# Patient Record
Sex: MALE | Race: OTHER | HISPANIC OR LATINO | URBAN - METROPOLITAN AREA
[De-identification: names, ages, dates, MRNs, and addresses within clinical notes are randomized per-mention and may not be internally consistent; named-entity substitution may affect disease eponyms.]

---

## 2024-07-30 ENCOUNTER — EMERGENCY (EMERGENCY)
Facility: HOSPITAL | Age: 49
LOS: 1 days | Discharge: HOME | End: 2024-07-30
Attending: EMERGENCY MEDICINE
Payer: COMMERCIAL

## 2024-07-30 VITALS
HEART RATE: 55 BPM | RESPIRATION RATE: 18 BRPM | DIASTOLIC BLOOD PRESSURE: 99 MMHG | HEIGHT: 66 IN | TEMPERATURE: 97 F | OXYGEN SATURATION: 98 % | WEIGHT: 154.98 LBS | SYSTOLIC BLOOD PRESSURE: 186 MMHG

## 2024-07-30 VITALS
OXYGEN SATURATION: 98 % | TEMPERATURE: 98 F | HEART RATE: 57 BPM | DIASTOLIC BLOOD PRESSURE: 79 MMHG | RESPIRATION RATE: 18 BRPM | SYSTOLIC BLOOD PRESSURE: 137 MMHG

## 2024-07-30 LAB
ADD ON TEST-SPECIMEN IN LAB: SIGNIFICANT CHANGE UP
ALBUMIN SERPL ELPH-MCNC: 4.9 G/DL — SIGNIFICANT CHANGE UP (ref 3.3–5)
ALP SERPL-CCNC: 109 U/L — SIGNIFICANT CHANGE UP (ref 40–120)
ALT FLD-CCNC: 24 U/L — SIGNIFICANT CHANGE UP (ref 10–45)
ANION GAP SERPL CALC-SCNC: 14 MMOL/L — SIGNIFICANT CHANGE UP (ref 5–17)
APTT BLD: 34.4 SEC — SIGNIFICANT CHANGE UP (ref 24.5–35.6)
AST SERPL-CCNC: 20 U/L — SIGNIFICANT CHANGE UP (ref 10–40)
BASE EXCESS BLDV CALC-SCNC: 1.6 MMOL/L — SIGNIFICANT CHANGE UP (ref -2–3)
BASOPHILS # BLD AUTO: 0.02 K/UL — SIGNIFICANT CHANGE UP (ref 0–0.2)
BASOPHILS NFR BLD AUTO: 0.3 % — SIGNIFICANT CHANGE UP (ref 0–2)
BILIRUB SERPL-MCNC: 0.3 MG/DL — SIGNIFICANT CHANGE UP (ref 0.2–1.2)
BUN SERPL-MCNC: 14 MG/DL — SIGNIFICANT CHANGE UP (ref 7–23)
CA-I SERPL-SCNC: 1.23 MMOL/L — SIGNIFICANT CHANGE UP (ref 1.15–1.33)
CALCIUM SERPL-MCNC: 10 MG/DL — SIGNIFICANT CHANGE UP (ref 8.4–10.5)
CHLORIDE BLDV-SCNC: 103 MMOL/L — SIGNIFICANT CHANGE UP (ref 96–108)
CHLORIDE SERPL-SCNC: 105 MMOL/L — SIGNIFICANT CHANGE UP (ref 96–108)
CO2 BLDV-SCNC: 29 MMOL/L — HIGH (ref 22–26)
CO2 SERPL-SCNC: 24 MMOL/L — SIGNIFICANT CHANGE UP (ref 22–31)
CREAT SERPL-MCNC: 0.86 MG/DL — SIGNIFICANT CHANGE UP (ref 0.5–1.3)
EGFR: 105 ML/MIN/1.73M2 — SIGNIFICANT CHANGE UP
EOSINOPHIL # BLD AUTO: 0.07 K/UL — SIGNIFICANT CHANGE UP (ref 0–0.5)
EOSINOPHIL NFR BLD AUTO: 1 % — SIGNIFICANT CHANGE UP (ref 0–6)
GAS PNL BLDV: 132 MMOL/L — LOW (ref 136–145)
GAS PNL BLDV: SIGNIFICANT CHANGE UP
GLUCOSE BLDV-MCNC: 92 MG/DL — SIGNIFICANT CHANGE UP (ref 70–99)
GLUCOSE SERPL-MCNC: 96 MG/DL — SIGNIFICANT CHANGE UP (ref 70–99)
HCO3 BLDV-SCNC: 27 MMOL/L — SIGNIFICANT CHANGE UP (ref 22–29)
HCT VFR BLD CALC: 44 % — SIGNIFICANT CHANGE UP (ref 39–50)
HCT VFR BLDA CALC: 47 % — SIGNIFICANT CHANGE UP (ref 39–51)
HGB BLD CALC-MCNC: 15.5 G/DL — SIGNIFICANT CHANGE UP (ref 12.6–17.4)
HGB BLD-MCNC: 15.1 G/DL — SIGNIFICANT CHANGE UP (ref 13–17)
IMM GRANULOCYTES NFR BLD AUTO: 0.4 % — SIGNIFICANT CHANGE UP (ref 0–0.9)
INR BLD: 1.04 RATIO — SIGNIFICANT CHANGE UP (ref 0.85–1.18)
LACTATE BLDV-MCNC: 1 MMOL/L — SIGNIFICANT CHANGE UP (ref 0.5–2)
LYMPHOCYTES # BLD AUTO: 2.64 K/UL — SIGNIFICANT CHANGE UP (ref 1–3.3)
LYMPHOCYTES # BLD AUTO: 36.8 % — SIGNIFICANT CHANGE UP (ref 13–44)
MCHC RBC-ENTMCNC: 31.5 PG — SIGNIFICANT CHANGE UP (ref 27–34)
MCHC RBC-ENTMCNC: 34.3 GM/DL — SIGNIFICANT CHANGE UP (ref 32–36)
MCV RBC AUTO: 91.7 FL — SIGNIFICANT CHANGE UP (ref 80–100)
MONOCYTES # BLD AUTO: 0.69 K/UL — SIGNIFICANT CHANGE UP (ref 0–0.9)
MONOCYTES NFR BLD AUTO: 9.6 % — SIGNIFICANT CHANGE UP (ref 2–14)
NEUTROPHILS # BLD AUTO: 3.72 K/UL — SIGNIFICANT CHANGE UP (ref 1.8–7.4)
NEUTROPHILS NFR BLD AUTO: 51.9 % — SIGNIFICANT CHANGE UP (ref 43–77)
NRBC # BLD: 0 /100 WBCS — SIGNIFICANT CHANGE UP (ref 0–0)
PCO2 BLDV: 45 MMHG — SIGNIFICANT CHANGE UP (ref 42–55)
PH BLDV: 7.39 — SIGNIFICANT CHANGE UP (ref 7.32–7.43)
PLATELET # BLD AUTO: 283 K/UL — SIGNIFICANT CHANGE UP (ref 150–400)
PO2 BLDV: 31 MMHG — SIGNIFICANT CHANGE UP (ref 25–45)
POTASSIUM BLDV-SCNC: 3.9 MMOL/L — SIGNIFICANT CHANGE UP (ref 3.5–5.1)
POTASSIUM SERPL-MCNC: 4.1 MMOL/L — SIGNIFICANT CHANGE UP (ref 3.5–5.3)
POTASSIUM SERPL-SCNC: 4.1 MMOL/L — SIGNIFICANT CHANGE UP (ref 3.5–5.3)
PROT SERPL-MCNC: 7.8 G/DL — SIGNIFICANT CHANGE UP (ref 6–8.3)
PROTHROM AB SERPL-ACNC: 10.9 SEC — SIGNIFICANT CHANGE UP (ref 9.5–13)
RBC # BLD: 4.8 M/UL — SIGNIFICANT CHANGE UP (ref 4.2–5.8)
RBC # FLD: 12.6 % — SIGNIFICANT CHANGE UP (ref 10.3–14.5)
SAO2 % BLDV: 52.7 % — LOW (ref 67–88)
SODIUM SERPL-SCNC: 143 MMOL/L — SIGNIFICANT CHANGE UP (ref 135–145)
TROPONIN T, HIGH SENSITIVITY RESULT: 6 NG/L — SIGNIFICANT CHANGE UP (ref 0–51)
WBC # BLD: 7.17 K/UL — SIGNIFICANT CHANGE UP (ref 3.8–10.5)
WBC # FLD AUTO: 7.17 K/UL — SIGNIFICANT CHANGE UP (ref 3.8–10.5)

## 2024-07-30 RX ORDER — TETRACAINE HCL 0.5 %
1 DROPS OPHTHALMIC (EYE) ONCE
Refills: 0 | Status: DISCONTINUED | OUTPATIENT
Start: 2024-07-30 | End: 2024-07-30

## 2024-07-30 RX ORDER — ATORVASTATIN CALCIUM 20 MG/1
1 TABLET, FILM COATED ORAL
Qty: 30 | Refills: 0
Start: 2024-07-30 | End: 2024-08-28

## 2024-07-30 RX ORDER — ASPIRIN 325 MG/1
81 TABLET, FILM COATED ORAL ONCE
Refills: 0 | Status: COMPLETED | OUTPATIENT
Start: 2024-07-30 | End: 2024-07-30

## 2024-07-30 RX ORDER — MECLIZINE HCL 25 MG
25 TABLET ORAL ONCE
Refills: 0 | Status: COMPLETED | OUTPATIENT
Start: 2024-07-30 | End: 2024-07-30

## 2024-07-30 RX ORDER — ATORVASTATIN CALCIUM 20 MG/1
40 TABLET, FILM COATED ORAL ONCE
Refills: 0 | Status: COMPLETED | OUTPATIENT
Start: 2024-07-30 | End: 2024-07-30

## 2024-07-30 RX ORDER — METOCLOPRAMIDE 5 MG/5ML
10 SOLUTION ORAL ONCE
Refills: 0 | Status: COMPLETED | OUTPATIENT
Start: 2024-07-30 | End: 2024-07-30

## 2024-07-30 RX ADMIN — METOCLOPRAMIDE 10 MILLIGRAM(S): 5 SOLUTION ORAL at 17:54

## 2024-07-30 RX ADMIN — Medication 25 MILLIGRAM(S): at 17:54

## 2024-07-30 RX ADMIN — ATORVASTATIN CALCIUM 40 MILLIGRAM(S): 20 TABLET, FILM COATED ORAL at 18:41

## 2024-07-30 RX ADMIN — ASPIRIN 81 MILLIGRAM(S): 325 TABLET, FILM COATED ORAL at 18:41

## 2024-07-30 NOTE — ED PROVIDER NOTE - OBJECTIVE STATEMENT
49-year-old male with past medical history of glaucoma and hyperlipidemia presenting with room spinning dizziness that was sudden onset while he was at work.  Patient also notes associated headache during this time.  Patient states that his symptoms at the time of evaluation are somewhat resolved but patient states that this was different than any headache or evidence of dizziness that he had had in the past.  Denies fever cough chest pain shortness of breath nausea vomiting diarrhea or dysuria.

## 2024-07-30 NOTE — ED PROVIDER NOTE - PHYSICAL EXAMINATION
PHYSICAL EXAM:  CONSTITUTIONAL: Well appearing, awake, alert, oriented to person, place, time/situation and in no apparent distress.  HEAD: Atraumatic  EYES: Clear bilaterally, pupils equal, round and reactive to light. OD:17mmHg   ENMT: Airway patent, Nasal mucosa clear. Mouth with normal mucosa. Uvula is midline.   CARDIAC: Normal rate, regular rhythm. +S1/S2. No murmurs, rubs or gallops.  RESPIRATORY: Breathing unlabored. Breath sounds clear and equal bilaterally.  ABDOMEN:  Soft, nontender, nondistended. No rebound tenderness or guarding.  NEUROLOGICAL: Alert and oriented, no focal deficits, no motor or sensory deficits. CN2-12 intact. Sensation intact x4 extremities.  SKIN: Skin warm and dry. No evidence of rashes or lesions.

## 2024-07-30 NOTE — ED ADULT NURSE NOTE - NSFALLUNIVINTERV_ED_ALL_ED
radiology results pending
Bed/Stretcher in lowest position, wheels locked, appropriate side rails in place/Call bell, personal items and telephone in reach/Instruct patient to call for assistance before getting out of bed/chair/stretcher/Non-slip footwear applied when patient is off stretcher/[place] to call system/Physically safe environment - no spills, clutter or unnecessary equipment/Purposeful proactive rounding/Room/bathroom lighting operational, light cord in reach

## 2024-07-30 NOTE — ED PROVIDER NOTE - NSFOLLOWUPCLINICS_GEN_ALL_ED_FT
[facility] - ENT  Otolaryngology (ENT)  [address] [place] [address]  Phone: [phone]  Fax:

## 2024-07-30 NOTE — ED PROVIDER NOTE - CARE PROVIDER_API CALL
Floyd Hooper  Neurology  [address], [address]  [place] [address]  Phone: [phone]  Fax: [phone]  Follow Up Time: Routine

## 2024-07-30 NOTE — ED ADULT NURSE NOTE - OBJECTIVE STATEMENT
49 yr old male with only h/o high cholesterol came in after being on his computer stated had a slight headache with some worsening blurred vision in right eye. has glaucoma in right eye. then states lost vision in both and saw colors in right eye. also had episode of dizziness. finger stick 127. on assessment a and o x 4 lungs clear abd soft non tender no swelling in extremities no n/v/d no fevers. says he feels normal right now. neuro assessment was clear no deficits.

## 2024-07-30 NOTE — ED PROVIDER NOTE - CLINICAL SUMMARY MEDICAL DECISION MAKING FREE TEXT BOX
49-year-old male with sudden onset headache and dizziness, stroke activated.  Will draw labs per stroke protocol.  Patient without actionable vitals and physical exam findings.   Will check eye pressures given history of glaucoma dispo per neurology 49-year-old male with sudden onset headache and dizziness, stroke activated because of associated visual loss.  Will draw labs per stroke protocol.  Patient without actionable vitals and physical exam findings.   CN 2-12 intact. Strength 5/5 throughout. SILT throughout. Normal finger-to-nose. No pronator drift. Normal gait. Will give meclizine, reglan, Will check eye pressures given history of glaucoma dispo per neurology.    Dr. Francois (Attending Physician)

## 2024-07-30 NOTE — ED PROVIDER NOTE - PATIENT PORTAL LINK FT
You can access the FollowMyHealth Patient Portal offered by [facility] by registering at the following website: [email or web address]. By joining [facility]’s FollowMyHealth portal, you will also be able to view your health information using other applications (apps) compatible with our system.

## 2024-07-30 NOTE — CONSULT NOTE ADULT - ASSESSMENT
ASSESSMENT   49-year-old R handed man, history HLD, arthrits in R shoulder, denies history of HTN, denies history of DM, glaucomain R eye presenting as code stroke for vertiginous symptoms.  CThead CTA negative. Notcandidatefor tenecteplase as symptomsresolved/non disabling. Notcandidate for thrombectomy asnoLVO.  NIHSS 0  MRS 0  LKW 1530 7/30/24  Not on AC/AP   /99  glucose 126    IMPRESSION   (1) brief episode of acute vestibular syndrome, resolved after several minutes positional in nature, more likely peripheral source. Advocates ear pain when attempting to performdix-hallpike  (2) blurred vision following dizziness b/l, followed by blurred vision in R eye with positive symptoms (seeing colors) w/ hx glaucoma  (3) slight L frontal headache non-positional in nature  may be central or peripheral, though given onset of symptoms with positional change followed by headache without other noted deficits (speech/swallowing/ataxia on exam) higher suspicion of peripheral source. Other differentials include BPPV,vestib migraine, primary ophthalmologic cause    RECOMMENDATION incomplete;not yet discussed with neurovasc fellow  [] migraine cocktail  [] vertigo symptomatic management PRN if symptoms return (meclizine 12.5 mg BID PRN)  [] A1c, lipid panel  [] orthostatics  [] would consider opthalmology evaluation for R eye +symptoms, blurred vision to evaluate for primary optho pathology  [] further workup of ?R ear pain per primary team  [] BGM goals 140-180  [] Refer for Vestibular Rehab on discharge  [] ENT eval on discharge  [] Neurology f/u outpatient  Upon discharge, patient should follow up with Dr. Hooper:  [phone] [address] [place] [address]  -If insurance is problem, can follow up with resident clinic at [address] [place] [address]. Can call [phone] for an appointment    Note finalized upon attestation.  ASSESSMENT   49-year-old R handed man, history HLD, arthrits in R shoulder, denies history of HTN, denies history of DM, glaucomain R eye presenting as code stroke for vertiginous symptoms.  CThead CTA negative. Notcandidatefor tenecteplase as symptomsresolved/non disabling. Notcandidate for thrombectomy asnoLVO.  NIHSS 0  MRS 0  LKW 1530 7/30/24  Not on AC/AP   /99  glucose 126    IMPRESSION   (1) brief episode of acute vestibular syndrome, resolved after several minutes positional in nature, more likely peripheral source. Advocates ear pain when attempting to performdix-hallpike  (2) blurred vision following dizziness b/l, followed by blurred vision in R eye with positive symptoms (seeing colors) w/ hx glaucoma  (3) slight L frontal headache non-positional in nature  may be central or peripheral, though given onset of symptoms with positional change followed by headache without other noted deficits (speech/swallowing/ataxia on exam) higher suspicion of peripheral source. Other differentials include BPPV,vestib migraine, primary ophthalmologic cause    RECOMMENDATION   [] aspirin 81 daily  [] atorvastatin 80 QHS  [] migraine cocktail PRN for headache symptoms  [] TTE, MRI brain w/o contrast which can be performed in outpatient setting  [] vertigo symptomatic management PRN if symptoms return (meclizine 12.5 mg BID PRN)  [] A1c, lipid panel  [] orthostatics  [] would consider ophthalmology evaluation for R eye +symptoms, blurred vision to evaluate for primary optho pathology  [] further workup of ?L ear pain per primary team  [] BGM goals 140-180  [] Refer for Vestibular Rehab on discharge  [] ENT eval on discharge  [] Neurology f/u outpatient  Upon discharge, patient should follow up with Dr. Hooper:  [phone] [address] [place] [address]  -If insurance is problem, can follow up with resident clinic at [address] [place] [address]. Can call [phone] for an appointment    discussed with neurovascular fellow dr ojeda under supervision of dr tadeo neurovascular fellow  Note finalized upon attestation.  ASSESSMENT   49-year-old R handed man, history HLD, arthrits in R shoulder, denies history of HTN, denies history of DM, glaucomain R eye presenting as code stroke for vertiginous symptoms.  CThead CTA negative. Notcandidatefor tenecteplase as symptomsresolved/non disabling. Notcandidate for thrombectomy asnoLVO.  NIHSS 0  MRS 0  LKW 1530 7/30/24  Not on AC/AP   /99  glucose 126    IMPRESSION   (1) brief episode of acute vestibular syndrome, resolved after several minutes positional in nature, more likely peripheral source. Advocates ear pain when attempting to performdix-hallpike  (2) blurred vision following dizziness b/l, followed by blurred vision in R eye with positive symptoms (seeing colors) w/ hx glaucoma  (3) slight L frontal headache non-positional in nature  may be central or peripheral, though given onset of symptoms with positional change followed by headache without other noted deficits (speech/swallowing/ataxia on exam) higher suspicion of peripheral source. Other differentials include BPPV,vestib migraine, primary ophthalmologic cause    RECOMMENDATION   [] aspirin 81 daily  [] atorvastatin 80 QHS  [] migraine cocktail PRN for headache symptoms  [] TTE, MRI brain w/o contrast which can be performed in outpatient setting  [] vertigo symptomatic management PRN if symptoms return (meclizine 12.5 mg BID PRN)  [] A1c, lipid panel  [] orthostatics  [] would consider ophthalmology evaluation for R eye +symptoms, blurred vision to evaluate for primary optho pathology  [] further workup of ?L ear pain per primary team  [] BGM goals 140-180  [] Refer for Vestibular Rehab on discharge  [] ENT eval on discharge  [] Neurology f/u outpatient  Upon discharge, patient should follow up with Dr. Hooper:  [phone] [address] [place] [place] [address]  -If insurance is problem, can follow up with resident clinic at [address] [place] [address]. Can call [phone] for an appointment    discussed with neurovascular fellow dr ojeda under supervision of dr tadeo neurovascular attending  Note finalized upon attestation.  ASSESSMENT   49-year-old R handed man, history HLD, arthrits in R shoulder, denies history of HTN, denies history of DM, glaucomain R eye presenting as code stroke for vertiginous symptoms.  CThead CTA negative. Notcandidatefor tenecteplase as symptomsresolved/non disabling. Notcandidate for thrombectomy asnoLVO.  NIHSS 0  MRS 0  LKW 1530 7/30/24  Not on AC/AP   /99  glucose 126    IMPRESSION   (1) brief episode of acute vestibular syndrome, resolved after several minutes positional in nature, more likely peripheral source. Advocates ear pain when attempting to performdix-hallpike  (2) blurred vision following dizziness b/l, followed by blurred vision in R eye with positive symptoms (seeing colors) w/ hx glaucoma  (3) slight L frontal headache non-positional in nature  may be central or peripheral, though given onset of symptoms with positional change followed by headache without other noted deficits (speech/swallowing/ataxia on exam) higher suspicion of peripheral source. Other differentials include BPPV,vestib migraine, primary ophthalmologic cause    RECOMMENDATION   [] aspirin 81 daily  [] atorvastatin 80 QHS  [] migraine cocktail PRN for headache symptoms  [] TTE, MRI brain w/o contrast which can be performed in outpatient setting  [] vertigo symptomatic management PRN if symptoms return (meclizine 12.5 mg BID PRN)  [] A1c, lipid panel  [] orthostatics  [] would consider ophthalmology evaluation for R eye +symptoms, blurred vision; defer to primary team  [] further workup of ?L ear pain per primary team  [] BGM goals 140-180  [] Refer for Vestibular Rehab on discharge  [] ENT eval on discharge  [] Neurology f/u outpatient  Upon discharge, patient should follow up with Dr. Hooper:  [phone] [address] [place] [address]  -If insurance is problem, can follow up with resident clinic at [address] [place] [address]. Can call [phone] for an appointment    discussed with neurovascular fellow dr ojeda under supervision of dr tadeo neurovascular attending  Note finalized upon attestation.

## 2024-07-30 NOTE — CONSULT NOTE ADULT - SUBJECTIVE AND OBJECTIVE BOX
Neurology - Consult Note    Spectra: [phone] ([facility]), [phone] ([facility])    HPI: 49-year-old R handed man, history HLD, denies history of HTN, denies history of DM, presenting as code stroke for vertiginous symptoms.    Patient reports he stood up and turned his head to the left side, then noted acute onset of     NIHSS 0  MRS 0  LKW 1530 7/30/24  Not on AC/AP   /99  glucose 126        Review of Systems:  INCOMPLETE   CONSTITUTIONAL: No fevers or chills  EYES AND ENT: No visual changes or no throat pain   NECK: No pain or stiffness  RESPIRATORY: No shortness of breath  CARDIOVASCULAR: No chest pain or palpitations  GASTROINTESTINAL: No nausea or vomiting   GENITOURINARY: No dysuria  NEUROLOGICAL: +As stated in HPI above  SKIN: No itching, burning, rashes, or lesions   All other review of systems is negative unless indicated above.    Allergies:  Allergy Status Unknown      PMHx/PSHx/Family Hx: As above, otherwise see below       Social Hx:  Never smoker; no current use of tobacco, alcohol, or illicit drugs  Lives with ***  Occupation ***  Baseline functional status is ***    Vitals:  T(C): 36.3 (07-30-24 @ 16:44), Max: 36.3 (07-30-24 @ 16:44)  HR: 55 (07-30-24 @ 16:44) (55 - 55)  BP: 186/99 (07-30-24 @ 16:44) (186/99 - 186/99)  RR: 18 (07-30-24 @ 16:44) (18 - 18)  SpO2: 98% (07-30-24 @ 16:44) (98% - 98%)    Physical Examination: INCOMPLETE  General - non-toxic appearing male/female in no acute distress  Neurologic Exam:  Mental status - Awake, Alert, Oriented to person, place, and time. Speech fluent, repetition and naming intact. Follows simple and complex commands.  Cranial nerves - PERRLA (4mm -> 3mm b/l), VFF, EOMI, face sensation (V1-V3) intact b/l, facial strength intact without asymmetry b/l, hearing intact b/l, palate with symmetric elevation,  sternocleidomastiod 5/5 strength b/l, tongue midline on protrusion with full lateral movement  Motor - Normal bulk and tone throughout. No pronator drift.  Strength testing            Deltoid      Biceps      Triceps     Wrist Extension    Wrist Flexion     Interossei         R            5                 5               5                     5                              5                        5                 5  L             5                 5               5                     5                              5                        5                 5              Hip Flexion    Hip Extension    Knee Flexion    Knee Extension    Dorsiflexion    Plantar Flexion  R              5                         5                       5                           5                            5                          5  L              5                         5                        5                           5                            5                          5  Sensation - Light touch/temperature OR pain/vibration intact throughout  DTR's -             Biceps      Triceps     Brachioradialis      Patellar    Ankle    Toes/plantar response  R             2+             2+                  2+                       2+            2+                 Down  L              2+             2+                 2+                        2+           2+                 Down    Coordination - Finger to Nose intact b/l. No tremors appreciated  Gait and station - Normal casual gait. Romberg (-)    Labs:          CAPILLARY BLOOD GLUCOSE      POCT Blood Glucose.: 126 mg/dL (30 Jul 2024 16:45)            Radiology:     Neurology - Consult Note    Spectra: [phone] ([facility]), [phone] ([facility])    HPI: 49-year-old R handed man, history HLD, arthrits in R shoulder, denies history of HTN, denies history of DM, glaucomain R eye presenting as code stroke for vertiginous symptoms.    Patient reports he stood up and turned his head to the left side, then noted acute onset of 'room spinning sensation'that lasted for about 5-6 minutes. He sat down, drank some water. States after this, noted b/l blurred vision which lasted for 4-5 minutes, followed be gradual resolution, though he noted his R eyehaving persistent blurred vision followed by 'colors' of orange,bluewhenever he looked down. Symptomsmresolved after several minutes. Noted after this episode slight L frontal headache.     Reports when attempting shonda-hallpike pressure/pain in L ear and in sinus region.    States no Hx vertigo,no Hxsmoking, novertigo in family.  Reports prior headaches diffuse and attributes to glaucoma.     No recentinjuries, no double vision, no slurred speech/changesin speech, no changesin hearing/ringing/pain.  Reports sore throat 1-2 weeks ago resolvedonits own.    Does not take any medications (states HLD managed through lifestyle changes)  Reports baseline light sensitivity.    NIHSS 0  MRS 0  LKW 1530 7/30/24  Not on AC/AP   /99  glucose 126        Review of Systems:  NEUROLOGICAL: +As stated in HPI above  All other review of systems is negative unless indicated above.    Allergies:  Allergy Status Unknown      PMHx/PSHx/Family Hx: As above, otherwise see below       Social Hx:  as above    Vitals:  T(C): 36.3 (07-30-24 @ 16:44), Max: 36.3 (07-30-24 @ 16:44)  HR: 55 (07-30-24 @ 16:44) (55 - 55)  BP: 186/99 (07-30-24 @ 16:44) (186/99 - 186/99)  RR: 18 (07-30-24 @ 16:44) (18 - 18)  SpO2: 98% (07-30-24 @ 16:44) (98% - 98%)    Physical Examination:   General - non-toxic appearing male in no acute distress  Neurologic Exam:  Mental status - Awake, Alert, Oriented to person, place, and time. Speech fluent, repetition and naming intact. Follows simple commands.  Cranial nerves - PERRLA (4mm -> 3mm b/l), VFF, EOMI, face sensation (V1-V3) intact b/l, facial strength intact without asymmetry b/l, hearing intact b/l, palate with symmetric elevation,  sternocleidomastiod 5/5 strength b/l, tongue midline on protrusion with full lateral movement. HINTS equivocal,no verticalskew,norapid corerction with head impulse howeverlimited aspatientnot relaxing neck with prompting. Shonda-hallpike negative.  Motor - Normal bulk and tone throughout. No pronator drift.  Strength testing 5/5 b/l UE,LE,  Sensation - Light intact throughout  Coordination - Finger to Nose intact b/l. Heel-shin intactb/l  Gait and station - Normal casual gait.     Labs:          CAPILLARY BLOOD GLUCOSE      POCT Blood Glucose.: 126 mg/dL (30 Jul 2024 16:45)            Radiology:    CT brain:  No hydrocephalus, acute intracranial hemorrhage, mass effect, or brain   edema.    Small chronic right cerebellar hemisphere infarct.    CTA brain:  No flow-limiting stenosis or vascular aneurysm. No AVM.    Venous system is well opacified, no evidence for venous sinus or cortical   vein thrombosis.    CTA neck:  No flow-limiting stenosis, evidence for arterial dissection, or vascular   aneurysm.

## 2024-07-30 NOTE — ED PROVIDER NOTE - NSFOLLOWUPINSTRUCTIONS_ED_ALL_ED_FT
You were seen in the emergency department with dizziness.  You had CAT scans of your head which did not show any acute abnormalities.  There was an area of the CAT scan that was abnormal which could possibly have been a stroke in the past.  For this reason our neurology colleagues would like you to take daily aspirin 81 mg, and also begin taking atorvastatin 80 mg once a day.        You should follow-up with Dr. Perez as discussed  You should follow-up with the ear nose and throat doctors for vestibular therapy for your vertigo.      Please return to the emergency department if your symptoms persist or worsen    Follow-up with your primary care physician.

## 2024-08-01 PROBLEM — Z00.00 ENCOUNTER FOR PREVENTIVE HEALTH EXAMINATION: Status: ACTIVE | Noted: 2024-08-01

## 2024-09-02 ENCOUNTER — NON-APPOINTMENT (OUTPATIENT)
Age: 49
End: 2024-09-02

## 2024-09-03 ENCOUNTER — APPOINTMENT (OUTPATIENT)
Dept: OPHTHALMOLOGY | Facility: CLINIC | Age: 49
End: 2024-09-03
Payer: COMMERCIAL

## 2024-09-03 PROCEDURE — 92133 CPTRZD OPH DX IMG PST SGM ON: CPT

## 2024-09-03 PROCEDURE — 92083 EXTENDED VISUAL FIELD XM: CPT

## 2024-09-03 PROCEDURE — 99204 OFFICE O/P NEW MOD 45 MIN: CPT
